# Patient Record
Sex: FEMALE | Race: AMERICAN INDIAN OR ALASKA NATIVE | NOT HISPANIC OR LATINO | URBAN - METROPOLITAN AREA
[De-identification: names, ages, dates, MRNs, and addresses within clinical notes are randomized per-mention and may not be internally consistent; named-entity substitution may affect disease eponyms.]

---

## 2019-01-17 ENCOUNTER — EMERGENCY (EMERGENCY)
Facility: HOSPITAL | Age: 64
LOS: 1 days | Discharge: HOME | End: 2019-01-17
Attending: EMERGENCY MEDICINE | Admitting: EMERGENCY MEDICINE
Payer: MEDICAID

## 2019-01-17 VITALS
RESPIRATION RATE: 19 BRPM | OXYGEN SATURATION: 100 % | TEMPERATURE: 98 F | HEART RATE: 102 BPM | SYSTOLIC BLOOD PRESSURE: 115 MMHG | DIASTOLIC BLOOD PRESSURE: 101 MMHG

## 2019-01-17 PROCEDURE — 99284 EMERGENCY DEPT VISIT MOD MDM: CPT | Mod: 25
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
## 2019-01-18 VITALS
RESPIRATION RATE: 18 BRPM | TEMPERATURE: 98 F | OXYGEN SATURATION: 100 % | SYSTOLIC BLOOD PRESSURE: 135 MMHG | HEART RATE: 90 BPM | DIASTOLIC BLOOD PRESSURE: 95 MMHG

## 2019-01-18 RX ORDER — HALOPERIDOL DECANOATE 100 MG/ML
5 INJECTION INTRAMUSCULAR ONCE
Qty: 0 | Refills: 0 | Status: COMPLETED | OUTPATIENT
Start: 2019-01-18 | End: 2019-01-18

## 2019-01-18 RX ADMIN — Medication 2 MILLIGRAM(S): at 00:34

## 2019-01-18 RX ADMIN — HALOPERIDOL DECANOATE 5 MILLIGRAM(S): 100 INJECTION INTRAMUSCULAR at 00:33

## 2019-01-18 NOTE — ED ADULT NURSE NOTE - NSIMPLEMENTINTERV_GEN_ALL_ED
Implemented All Fall Risk Interventions:  [place] to call system. Call bell, personal items and telephone within reach. Instruct patient to call for assistance. Room bathroom lighting operational. Non-slip footwear when patient is off stretcher. Physically safe environment: no spills, clutter or unnecessary equipment. Stretcher in lowest position, wheels locked, appropriate side rails in place. Provide visual cue, wrist band, yellow gown, etc. Monitor gait and stability. Monitor for mental status changes and reorient to person, place, and time. Review medications for side effects contributing to fall risk. Reinforce activity limits and safety measures with patient and family.

## 2019-01-18 NOTE — ED PROVIDER NOTE - CONSTITUTIONAL, MLM
normal... well nourished, awake, alert, oriented to person, place, time/situation but slurring speech.

## 2019-01-18 NOTE — ED ADULT NURSE REASSESSMENT NOTE - NS ED NURSE REASSESS COMMENT FT1
Registration called to register pt before discharge. pt in NAD, provided sandwich, will continue to monitor.
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
## 2019-01-18 NOTE — ED ADULT NURSE NOTE - OBJECTIVE STATEMENT
pt received to rm 13 arrived as intox pt. pt a&ox4 and ambulatory at baseline, skin intact, respirations even and unlabored, abd soft and non-distended. pt at risk for elopement- attempting to leave the room, pt medicated by NADEGE Youngblood, placed on enhanced supervision. pt denying SI/HI. will continue to monitor.
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
## 2019-01-18 NOTE — ED ADULT NURSE REASSESSMENT NOTE - NS ED NURSE REASSESS COMMENT FT1
break coverage RN: pt sleeping, appears in NAD, respirations equal and non labored. pt remains on enhanced care for safety and elopement risk. vitals as documents, will continue to monitor.

## 2019-01-18 NOTE — ED PROVIDER NOTE - PROGRESS NOTE DETAILS
Carmen: patient clinically sober, ambulating and tolerating oral intake. spoke with SW. appropriate for dc to home.

## 2019-04-20 ENCOUNTER — EMERGENCY (EMERGENCY)
Facility: HOSPITAL | Age: 64
LOS: 1 days | Discharge: HOME | End: 2019-04-20
Attending: EMERGENCY MEDICINE
Payer: SELF-PAY

## 2019-04-20 VITALS
RESPIRATION RATE: 16 BRPM | DIASTOLIC BLOOD PRESSURE: 81 MMHG | HEART RATE: 95 BPM | SYSTOLIC BLOOD PRESSURE: 126 MMHG | OXYGEN SATURATION: 98 % | TEMPERATURE: 98 F

## 2019-04-20 VITALS
DIASTOLIC BLOOD PRESSURE: 85 MMHG | WEIGHT: 119.93 LBS | RESPIRATION RATE: 16 BRPM | TEMPERATURE: 98 F | HEIGHT: 55 IN | HEART RATE: 88 BPM | SYSTOLIC BLOOD PRESSURE: 146 MMHG | OXYGEN SATURATION: 97 %

## 2019-04-20 PROCEDURE — 70450 CT HEAD/BRAIN W/O DYE: CPT

## 2019-04-20 PROCEDURE — 90471 IMMUNIZATION ADMIN: CPT

## 2019-04-20 PROCEDURE — 99284 EMERGENCY DEPT VISIT MOD MDM: CPT | Mod: 25

## 2019-04-20 PROCEDURE — 90715 TDAP VACCINE 7 YRS/> IM: CPT

## 2019-04-20 PROCEDURE — 12011 RPR F/E/E/N/L/M 2.5 CM/<: CPT

## 2019-04-20 PROCEDURE — 70486 CT MAXILLOFACIAL W/O DYE: CPT | Mod: 26

## 2019-04-20 PROCEDURE — 99285 EMERGENCY DEPT VISIT HI MDM: CPT | Mod: 25

## 2019-04-20 PROCEDURE — 70486 CT MAXILLOFACIAL W/O DYE: CPT

## 2019-04-20 PROCEDURE — 99053 MED SERV 10PM-8AM 24 HR FAC: CPT

## 2019-04-20 PROCEDURE — 70450 CT HEAD/BRAIN W/O DYE: CPT | Mod: 26

## 2019-04-20 PROCEDURE — 82962 GLUCOSE BLOOD TEST: CPT

## 2019-04-20 RX ORDER — TETANUS TOXOID, REDUCED DIPHTHERIA TOXOID AND ACELLULAR PERTUSSIS VACCINE, ADSORBED 5; 2.5; 8; 8; 2.5 [IU]/.5ML; [IU]/.5ML; UG/.5ML; UG/.5ML; UG/.5ML
0.5 SUSPENSION INTRAMUSCULAR ONCE
Qty: 0 | Refills: 0 | Status: COMPLETED | OUTPATIENT
Start: 2019-04-20 | End: 2019-04-20

## 2019-04-20 RX ADMIN — TETANUS TOXOID, REDUCED DIPHTHERIA TOXOID AND ACELLULAR PERTUSSIS VACCINE, ADSORBED 0.5 MILLILITER(S): 5; 2.5; 8; 8; 2.5 SUSPENSION INTRAMUSCULAR at 06:58

## 2019-04-20 NOTE — ED PROVIDER NOTE - PHYSICAL EXAMINATION
GENERAL: wells appearing, no acute distress, etoh on breath   HEAD: +L frontal hematoma with overlying 2 cm laceration   EYES: EOMI, pink conjunctiva   ENT: moist oral mucosa   CARDIAC: RRR, no edema, distal pulses present   RESPIRATORY: lungs CTAB, no increased work of breathing   GASTROINTESTINAL: no abdominal tenderness, no rebound or guarding, bowel sounds presents  GENITOURINARY: no CVA tenderness   MUSCULOSKELETAL: no deformity   NEUROLOGICAL: AAOx3, CN's II-XII intact, strength 5/5 bilateral UE and LE, sensation intact to light touch  SKIN: +L frontal laceration   PSYCHIATRIC: cooperative  HEME LYMPH: no lymphadenopathy

## 2019-04-20 NOTE — CHART NOTE - NSCHARTNOTEFT_GEN_A_CORE
ED called. Patient there for alcohol intoxication, ready for discharge. She is giving several different places she lives and than states she is homeless. Spoke with patient by phone. She denied being homeless. She gives her address as [facility]-[address] [place] [address]. Patient states  and children live in [place] and she plans to join them soon, that they are sending for her. Spoke with Nurse and relayed information. offered shelter information in case needed which she did not take but will call back if needed.

## 2019-04-20 NOTE — ED PROVIDER NOTE - PROGRESS NOTE DETAILS
CT head - no hemorrhage  CT max face - no fracture  Laceration repaired.  Pt still clinically intoxicated. Will sign out to day team to reassess for clinical sobriety. pt observed in ED. pt given breakfast.  clinically sober.  will d/c

## 2019-04-20 NOTE — ED ADULT NURSE NOTE - ED STAT RN HANDOFF DETAILS 3
Patient discharged home after speaking to  and . Patient provided with taxi service to [phone] [facility]. Patient leaving ambulatory in no acute distress placed in taxi service.
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
## 2019-04-20 NOTE — ED ADULT NURSE NOTE - ED STAT RN HANDOFF DETAILS 2
Received patient from [facility] Marre[facility] not admitted here for ETOH with laceration to forehead. Dr. Smith at bedside suturing. Continue to monitor patient.
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
## 2019-04-20 NOTE — ED ADULT NURSE NOTE - OBJECTIVE STATEMENT
Pt biba for intoxication and confusion , pt is confused , loud, there is a laceration on the left forehead with minimal bleeding

## 2019-04-20 NOTE — ED PROVIDER NOTE - NS ED ROS FT
CONSTITUTIONAL: no fever, no chills   EYES: no visual changes, no eye pain   ENMT: no nasal congestion, no throat pain  CARDIOVASCULAR: no chest pain, no edema, no palpitations   RESPIRATORY: no shortness of breath, no cough   GASTROINTESTINAL: no abdominal pain, no nausea, no vomiting, no diarrhea, no constipation   GENITOURINARY: no dysuria, no frequency  MUSCULOSKELETAL: no joint pains, no myalgias, no back pain   SKIN: no rashes, +laceration   NEUROLOGICAL: no weakness, no headache, no dizziness, no slurred speech, no syncope   PSYCHIATRIC: no known mental health illness   HEME/LYMPH: no lymphadenopathy      All other ROS negative except as per HPI

## 2019-05-08 ENCOUNTER — EMERGENCY (EMERGENCY)
Facility: HOSPITAL | Age: 64
LOS: 0 days | Discharge: HOME | End: 2019-05-08
Attending: EMERGENCY MEDICINE
Payer: MEDICAID

## 2019-05-08 VITALS
DIASTOLIC BLOOD PRESSURE: 71 MMHG | TEMPERATURE: 97 F | RESPIRATION RATE: 18 BRPM | OXYGEN SATURATION: 97 % | HEIGHT: 63 IN | WEIGHT: 160.06 LBS | SYSTOLIC BLOOD PRESSURE: 127 MMHG | HEART RATE: 72 BPM

## 2019-05-08 VITALS
SYSTOLIC BLOOD PRESSURE: 154 MMHG | RESPIRATION RATE: 18 BRPM | HEART RATE: 90 BPM | OXYGEN SATURATION: 98 % | TEMPERATURE: 98 F | DIASTOLIC BLOOD PRESSURE: 89 MMHG

## 2019-05-08 DIAGNOSIS — R41.82 ALTERED MENTAL STATUS, UNSPECIFIED: ICD-10-CM

## 2019-05-08 DIAGNOSIS — F10.129 ALCOHOL ABUSE WITH INTOXICATION, UNSPECIFIED: ICD-10-CM

## 2019-05-08 LAB
ALBUMIN SERPL ELPH-MCNC: 3.3 G/DL — SIGNIFICANT CHANGE UP (ref 3.3–5)
ALP SERPL-CCNC: 62 U/L — SIGNIFICANT CHANGE UP (ref 40–120)
ALT FLD-CCNC: 78 U/L — SIGNIFICANT CHANGE UP (ref 12–78)
ANION GAP SERPL CALC-SCNC: 6 MMOL/L — SIGNIFICANT CHANGE UP (ref 5–17)
APTT BLD: 28.1 SEC — LOW (ref 28.5–37)
AST SERPL-CCNC: 103 U/L — HIGH (ref 15–37)
BASOPHILS # BLD AUTO: 0.05 K/UL — SIGNIFICANT CHANGE UP (ref 0–0.2)
BASOPHILS NFR BLD AUTO: 1.3 % — SIGNIFICANT CHANGE UP (ref 0–2)
BILIRUB SERPL-MCNC: 0.2 MG/DL — SIGNIFICANT CHANGE UP (ref 0.2–1.2)
BUN SERPL-MCNC: 8 MG/DL — SIGNIFICANT CHANGE UP (ref 7–23)
CALCIUM SERPL-MCNC: 8.6 MG/DL — SIGNIFICANT CHANGE UP (ref 8.5–10.1)
CHLORIDE SERPL-SCNC: 109 MMOL/L — HIGH (ref 96–108)
CO2 SERPL-SCNC: 31 MMOL/L — SIGNIFICANT CHANGE UP (ref 22–31)
CREAT SERPL-MCNC: 0.52 MG/DL — SIGNIFICANT CHANGE UP (ref 0.5–1.3)
EOSINOPHIL # BLD AUTO: 0.21 K/UL — SIGNIFICANT CHANGE UP (ref 0–0.5)
EOSINOPHIL NFR BLD AUTO: 5.6 % — SIGNIFICANT CHANGE UP (ref 0–6)
ETHANOL SERPL-MCNC: 462 MG/DL — HIGH (ref 0–10)
GLUCOSE BLDC GLUCOMTR-MCNC: 92 MG/DL — SIGNIFICANT CHANGE UP (ref 70–99)
GLUCOSE SERPL-MCNC: 102 MG/DL — HIGH (ref 70–99)
HCT VFR BLD CALC: 37.7 % — SIGNIFICANT CHANGE UP (ref 34.5–45)
HGB BLD-MCNC: 12.3 G/DL — SIGNIFICANT CHANGE UP (ref 11.5–15.5)
IMM GRANULOCYTES NFR BLD AUTO: 0.3 % — SIGNIFICANT CHANGE UP (ref 0–1.5)
INR BLD: 0.9 RATIO — SIGNIFICANT CHANGE UP (ref 0.88–1.16)
LYMPHOCYTES # BLD AUTO: 1.79 K/UL — SIGNIFICANT CHANGE UP (ref 1–3.3)
LYMPHOCYTES # BLD AUTO: 47.9 % — HIGH (ref 13–44)
MCHC RBC-ENTMCNC: 31.6 PG — SIGNIFICANT CHANGE UP (ref 27–34)
MCHC RBC-ENTMCNC: 32.6 GM/DL — SIGNIFICANT CHANGE UP (ref 32–36)
MCV RBC AUTO: 96.9 FL — SIGNIFICANT CHANGE UP (ref 80–100)
MONOCYTES # BLD AUTO: 0.47 K/UL — SIGNIFICANT CHANGE UP (ref 0–0.9)
MONOCYTES NFR BLD AUTO: 12.6 % — SIGNIFICANT CHANGE UP (ref 2–14)
NEUTROPHILS # BLD AUTO: 1.21 K/UL — LOW (ref 1.8–7.4)
NEUTROPHILS NFR BLD AUTO: 32.3 % — LOW (ref 43–77)
NRBC # BLD: 0 /100 WBCS — SIGNIFICANT CHANGE UP (ref 0–0)
PLATELET # BLD AUTO: 218 K/UL — SIGNIFICANT CHANGE UP (ref 150–400)
POTASSIUM SERPL-MCNC: 3 MMOL/L — LOW (ref 3.5–5.3)
POTASSIUM SERPL-SCNC: 3 MMOL/L — LOW (ref 3.5–5.3)
PROT SERPL-MCNC: 7.7 GM/DL — SIGNIFICANT CHANGE UP (ref 6–8.3)
PROTHROM AB SERPL-ACNC: 10.1 SEC — SIGNIFICANT CHANGE UP (ref 10–12.9)
RBC # BLD: 3.89 M/UL — SIGNIFICANT CHANGE UP (ref 3.8–5.2)
RBC # FLD: 13.9 % — SIGNIFICANT CHANGE UP (ref 10.3–14.5)
SODIUM SERPL-SCNC: 146 MMOL/L — HIGH (ref 135–145)
WBC # BLD: 3.74 K/UL — LOW (ref 3.8–10.5)
WBC # FLD AUTO: 3.74 K/UL — LOW (ref 3.8–10.5)

## 2019-05-08 PROCEDURE — 70450 CT HEAD/BRAIN W/O DYE: CPT | Mod: 26

## 2019-05-08 PROCEDURE — 99285 EMERGENCY DEPT VISIT HI MDM: CPT | Mod: 25

## 2019-05-08 PROCEDURE — 73560 X-RAY EXAM OF KNEE 1 OR 2: CPT | Mod: 26,RT

## 2019-05-08 PROCEDURE — 72125 CT NECK SPINE W/O DYE: CPT | Mod: 26

## 2019-05-08 RX ORDER — POTASSIUM CHLORIDE 20 MEQ
40 PACKET (EA) ORAL ONCE
Qty: 0 | Refills: 0 | Status: COMPLETED | OUTPATIENT
Start: 2019-05-08 | End: 2019-05-08

## 2019-05-08 RX ORDER — ACETAMINOPHEN 500 MG
650 TABLET ORAL ONCE
Qty: 0 | Refills: 0 | Status: COMPLETED | OUTPATIENT
Start: 2019-05-08 | End: 2019-05-08

## 2019-05-08 RX ORDER — SODIUM CHLORIDE 9 MG/ML
1000 INJECTION, SOLUTION INTRAVENOUS
Qty: 0 | Refills: 0 | Status: COMPLETED | OUTPATIENT
Start: 2019-05-08 | End: 2019-05-08

## 2019-05-08 RX ADMIN — Medication 650 MILLIGRAM(S): at 15:02

## 2019-05-08 RX ADMIN — Medication 1 MILLIGRAM(S): at 13:23

## 2019-05-08 RX ADMIN — SODIUM CHLORIDE 1000 MILLILITER(S): 9 INJECTION, SOLUTION INTRAVENOUS at 14:46

## 2019-05-08 RX ADMIN — SODIUM CHLORIDE 200 MILLILITER(S): 9 INJECTION, SOLUTION INTRAVENOUS at 08:33

## 2019-05-08 RX ADMIN — Medication 2 MILLIGRAM(S): at 09:38

## 2019-05-08 RX ADMIN — Medication 650 MILLIGRAM(S): at 15:32

## 2019-05-08 RX ADMIN — Medication 40 MILLIEQUIVALENT(S): at 14:00

## 2019-05-08 NOTE — ED PROVIDER NOTE - PHYSICAL EXAMINATION
Gen: Alert, Well appearing. NAD  , ++ alcohol on breath  Head: NC, AT, PERRL, EOMI, normal lids/conjunctiva   ENT: Bilateral TM WNL, patent oropharynx without erythema/exudate, uvula midline  Neck: supple, no tenderness/meningismus  Pulm: Bilateral clear BS, normal resp effort, no wheeze/stridor/retractions  CV: RRR, no M/R/G, +dist pulses   Abd: soft, NT/ND, +BS, no guarding/rebound tenderness  Mskel: + rt knee tender to palpation,+ some crepitus, but from and pulses intact.  Skin: + old abrasion to left forehead  Neuro: AAOx3, no sensory/motor deficits, CN 2-12 intact , + slurred speech, no facial droop

## 2019-05-08 NOTE — ED ADULT TRIAGE NOTE - CHIEF COMPLAINT QUOTE
Patient BIBA: Strong smell of Alcohol on breath. Patient found on sidewalk outside of business. No Identification. Patient uncooperative with EMS. Patient with unintelligible garbled speech. Patient states "Go away." Patient BIBA: Strong smell of Alcohol on breath. Patient found on sidewalk outside of business. No Identification. Patient uncooperative with EMS. Patient with unintelligible garbled speech. Patient states "Go away." FS 92

## 2019-05-08 NOTE — ED ADULT NURSE NOTE - NSIMPLEMENTINTERV_GEN_ALL_ED
Implemented All Fall Risk Interventions:  [place] to call system. Call bell, personal items and telephone within reach. Instruct patient to call for assistance. Room bathroom lighting operational. Non-slip footwear when patient is off stretcher. Physically safe environment: no spills, clutter or unnecessary equipment. Stretcher in lowest position, wheels locked, appropriate side rails in place. Provide visual cue, wrist band, yellow gown, etc. Monitor gait and stability. Monitor for mental status changes and reorient to person, place, and time. Review medications for side effects contributing to fall risk. Reinforce activity limits and safety measures with patient and family.

## 2019-05-08 NOTE — ED ADULT NURSE REASSESSMENT NOTE - NS ED NURSE REASSESS COMMENT FT1
pt asleep in no acute distress , iv infusion in progress, iv access patent, safety measure implemented
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
## 2019-05-08 NOTE — ED PROVIDER NOTE - PROGRESS NOTE DETAILS
Pt given ativan for agitation. pt now Aox3, ambulatory, and sober. SW d/w pt,given family concern, pt adamant she does not have a drinking problem and does not want rehab. SW arranged transport to friends house.

## 2019-05-08 NOTE — ED ADULT NURSE NOTE - CHIEF COMPLAINT QUOTE
Patient BIBA: Strong smell of Alcohol on breath. Patient found on sidewalk outside of business. No Identification. Patient uncooperative with EMS. Patient with unintelligible garbled speech. Patient states "Go away." FS 92

## 2019-05-08 NOTE — ED ADULT NURSE NOTE - OBJECTIVE STATEMENT
pt Alert but confused, and smells of alcohol. offers no co/o pain at this time. Resting in stretcher. Does not remember how she got to the hospital. Unable to state PMH. pt states she fell after drinking

## 2019-05-08 NOTE — ED ADULT NURSE REASSESSMENT NOTE - NS ED NURSE REASSESS COMMENT FT1
pt resting in stretcher, now Calm. pt ate lunch. NAD. offers no c/o at this time. pt still unsteady on her feet, slurred speech. Monitoring continues

## 2019-06-01 ENCOUNTER — OUTPATIENT (OUTPATIENT)
Dept: OUTPATIENT SERVICES | Facility: HOSPITAL | Age: 64
LOS: 1 days | End: 2019-06-01
Payer: MEDICAID

## 2019-06-01 PROCEDURE — G9001: CPT

## 2019-06-12 DIAGNOSIS — Z71.89 OTHER SPECIFIED COUNSELING: ICD-10-CM

## 2021-09-20 NOTE — ED PROVIDER NOTE - PRINCIPAL DIAGNOSIS
End of Shift Summary  For vital signs and complete assessments, please see documentation flowsheets.     Pertinent assessments: POD #3. VSS. Afebrile. LS clear. BS hypoactive, ostomy with gas and 100 stool output. Pt reports discomfort/cramping in abdomen, treated with ice pack and 5 mg PO Oxy. Low Fiber diet, tolerating well. Denies nausea. SBA to ambulate, needs encouragement to get OOB. PIV - LR @ 50 ml/hr. Voiding without issues. Lap sites with glue, MARGIE, no redness noted. MIRZA drain with 100 ml output. A&O. Slept well.    Major Shift Events: none    Treatment Plan: Continue to tolerate diet,  ostomy education, Encourage independence      Alcohol intoxication
